# Patient Record
Sex: MALE | Employment: UNEMPLOYED | URBAN - METROPOLITAN AREA
[De-identification: names, ages, dates, MRNs, and addresses within clinical notes are randomized per-mention and may not be internally consistent; named-entity substitution may affect disease eponyms.]

---

## 2018-11-28 ENCOUNTER — APPOINTMENT (OUTPATIENT)
Dept: GENERAL RADIOLOGY | Age: 43
DRG: 897 | End: 2018-11-28
Attending: EMERGENCY MEDICINE
Payer: COMMERCIAL

## 2018-11-28 ENCOUNTER — HOSPITAL ENCOUNTER (INPATIENT)
Age: 43
LOS: 2 days | Discharge: HOME | DRG: 897 | End: 2018-11-30
Attending: EMERGENCY MEDICINE | Admitting: INTERNAL MEDICINE
Payer: COMMERCIAL

## 2018-11-28 ENCOUNTER — APPOINTMENT (OUTPATIENT)
Dept: CT IMAGING | Age: 43
DRG: 897 | End: 2018-11-28
Attending: EMERGENCY MEDICINE
Payer: COMMERCIAL

## 2018-11-28 DIAGNOSIS — R29.810 FACIAL WEAKNESS: Primary | ICD-10-CM

## 2018-11-28 DIAGNOSIS — F10.929 ALCOHOLIC INTOXICATION WITH COMPLICATION (HCC): ICD-10-CM

## 2018-11-28 DIAGNOSIS — R56.9 SEIZURE (HCC): ICD-10-CM

## 2018-11-28 DIAGNOSIS — F10.121 ALCOHOL INTOXICATION DELIRIUM (HCC): ICD-10-CM

## 2018-11-28 LAB
ALBUMIN SERPL-MCNC: 3.7 G/DL (ref 3.5–5)
ALBUMIN/GLOB SERPL: 0.9 {RATIO} (ref 1.1–2.2)
ALP SERPL-CCNC: 64 U/L (ref 45–117)
ALT SERPL-CCNC: 20 U/L (ref 12–78)
AMPHET UR QL SCN: NEGATIVE
ANION GAP SERPL CALC-SCNC: 9 MMOL/L (ref 5–15)
APPEARANCE UR: CLEAR
AST SERPL-CCNC: 22 U/L (ref 15–37)
BACTERIA URNS QL MICRO: NEGATIVE /HPF
BARBITURATES UR QL SCN: NEGATIVE
BASOPHILS # BLD: 0.1 K/UL (ref 0–0.1)
BASOPHILS NFR BLD: 1 % (ref 0–1)
BENZODIAZ UR QL: POSITIVE
BILIRUB SERPL-MCNC: 0.2 MG/DL (ref 0.2–1)
BILIRUB UR QL: NEGATIVE
BUN SERPL-MCNC: 7 MG/DL (ref 6–20)
BUN/CREAT SERPL: 10 (ref 12–20)
CALCIUM SERPL-MCNC: 8.2 MG/DL (ref 8.5–10.1)
CANNABINOIDS UR QL SCN: POSITIVE
CHLORIDE SERPL-SCNC: 105 MMOL/L (ref 97–108)
CO2 SERPL-SCNC: 29 MMOL/L (ref 21–32)
COCAINE UR QL SCN: NEGATIVE
COLOR UR: NORMAL
COMMENT, HOLDF: NORMAL
CREAT SERPL-MCNC: 0.69 MG/DL (ref 0.7–1.3)
DIFFERENTIAL METHOD BLD: ABNORMAL
DRUG SCRN COMMENT,DRGCM: ABNORMAL
EOSINOPHIL # BLD: 0.3 K/UL (ref 0–0.4)
EOSINOPHIL NFR BLD: 4 % (ref 0–7)
EPITH CASTS URNS QL MICRO: NORMAL /LPF
ERYTHROCYTE [DISTWIDTH] IN BLOOD BY AUTOMATED COUNT: 13.2 % (ref 11.5–14.5)
ETHANOL SERPL-MCNC: 290 MG/DL
GLOBULIN SER CALC-MCNC: 3.9 G/DL (ref 2–4)
GLUCOSE SERPL-MCNC: 89 MG/DL (ref 65–100)
GLUCOSE UR STRIP.AUTO-MCNC: NEGATIVE MG/DL
HCT VFR BLD AUTO: 44.4 % (ref 36.6–50.3)
HGB BLD-MCNC: 14.9 G/DL (ref 12.1–17)
HGB UR QL STRIP: NEGATIVE
HYALINE CASTS URNS QL MICRO: NORMAL /LPF (ref 0–5)
IMM GRANULOCYTES # BLD: 0 K/UL (ref 0–0.04)
IMM GRANULOCYTES NFR BLD AUTO: 0 % (ref 0–0.5)
KETONES UR QL STRIP.AUTO: NEGATIVE MG/DL
LEUKOCYTE ESTERASE UR QL STRIP.AUTO: NEGATIVE
LYMPHOCYTES # BLD: 3.9 K/UL (ref 0.8–3.5)
LYMPHOCYTES NFR BLD: 57 % (ref 12–49)
MAGNESIUM SERPL-MCNC: 2.3 MG/DL (ref 1.6–2.4)
MCH RBC QN AUTO: 32.8 PG (ref 26–34)
MCHC RBC AUTO-ENTMCNC: 33.6 G/DL (ref 30–36.5)
MCV RBC AUTO: 97.8 FL (ref 80–99)
METHADONE UR QL: NEGATIVE
MONOCYTES # BLD: 0.7 K/UL (ref 0–1)
MONOCYTES NFR BLD: 11 % (ref 5–13)
NEUTS SEG # BLD: 1.8 K/UL (ref 1.8–8)
NEUTS SEG NFR BLD: 27 % (ref 32–75)
NITRITE UR QL STRIP.AUTO: NEGATIVE
NRBC # BLD: 0 K/UL (ref 0–0.01)
NRBC BLD-RTO: 0 PER 100 WBC
OPIATES UR QL: NEGATIVE
PCP UR QL: NEGATIVE
PH UR STRIP: 5.5 [PH] (ref 5–8)
PLATELET # BLD AUTO: 276 K/UL (ref 150–400)
PMV BLD AUTO: 8.6 FL (ref 8.9–12.9)
POTASSIUM SERPL-SCNC: 3.3 MMOL/L (ref 3.5–5.1)
PROT SERPL-MCNC: 7.6 G/DL (ref 6.4–8.2)
PROT UR STRIP-MCNC: NEGATIVE MG/DL
RBC # BLD AUTO: 4.54 M/UL (ref 4.1–5.7)
RBC #/AREA URNS HPF: NORMAL /HPF (ref 0–5)
SAMPLES BEING HELD,HOLD: NORMAL
SODIUM SERPL-SCNC: 143 MMOL/L (ref 136–145)
SP GR UR REFRACTOMETRY: 1.01 (ref 1–1.03)
UR CULT HOLD, URHOLD: NORMAL
UROBILINOGEN UR QL STRIP.AUTO: 0.2 EU/DL (ref 0.2–1)
WBC # BLD AUTO: 6.7 K/UL (ref 4.1–11.1)
WBC URNS QL MICRO: NORMAL /HPF (ref 0–4)

## 2018-11-28 PROCEDURE — 93005 ELECTROCARDIOGRAM TRACING: CPT

## 2018-11-28 PROCEDURE — 71045 X-RAY EXAM CHEST 1 VIEW: CPT

## 2018-11-28 PROCEDURE — 74011250636 HC RX REV CODE- 250/636: Performed by: INTERNAL MEDICINE

## 2018-11-28 PROCEDURE — 83735 ASSAY OF MAGNESIUM: CPT

## 2018-11-28 PROCEDURE — 80053 COMPREHEN METABOLIC PANEL: CPT

## 2018-11-28 PROCEDURE — 81001 URINALYSIS AUTO W/SCOPE: CPT

## 2018-11-28 PROCEDURE — 36415 COLL VENOUS BLD VENIPUNCTURE: CPT

## 2018-11-28 PROCEDURE — 85025 COMPLETE CBC W/AUTO DIFF WBC: CPT

## 2018-11-28 PROCEDURE — 74011250636 HC RX REV CODE- 250/636: Performed by: EMERGENCY MEDICINE

## 2018-11-28 PROCEDURE — 70450 CT HEAD/BRAIN W/O DYE: CPT

## 2018-11-28 PROCEDURE — 65610000006 HC RM INTENSIVE CARE

## 2018-11-28 PROCEDURE — 80307 DRUG TEST PRSMV CHEM ANLYZR: CPT

## 2018-11-28 PROCEDURE — 96360 HYDRATION IV INFUSION INIT: CPT

## 2018-11-28 PROCEDURE — 99285 EMERGENCY DEPT VISIT HI MDM: CPT

## 2018-11-28 RX ORDER — LORAZEPAM 2 MG/ML
2 INJECTION INTRAMUSCULAR
Status: DISPENSED | OUTPATIENT
Start: 2018-11-28 | End: 2018-11-29

## 2018-11-28 RX ORDER — LORAZEPAM 2 MG/ML
2 INJECTION INTRAMUSCULAR
Status: DISCONTINUED | OUTPATIENT
Start: 2018-11-28 | End: 2018-11-30 | Stop reason: HOSPADM

## 2018-11-28 RX ORDER — SODIUM CHLORIDE, SODIUM LACTATE, POTASSIUM CHLORIDE, CALCIUM CHLORIDE 600; 310; 30; 20 MG/100ML; MG/100ML; MG/100ML; MG/100ML
150 INJECTION, SOLUTION INTRAVENOUS CONTINUOUS
Status: DISCONTINUED | OUTPATIENT
Start: 2018-11-28 | End: 2018-11-30 | Stop reason: HOSPADM

## 2018-11-28 RX ORDER — SODIUM CHLORIDE 0.9 % (FLUSH) 0.9 %
5-10 SYRINGE (ML) INJECTION AS NEEDED
Status: DISCONTINUED | OUTPATIENT
Start: 2018-11-28 | End: 2018-11-30 | Stop reason: HOSPADM

## 2018-11-28 RX ORDER — LORAZEPAM 2 MG/ML
4 INJECTION INTRAMUSCULAR
Status: DISCONTINUED | OUTPATIENT
Start: 2018-11-28 | End: 2018-11-30 | Stop reason: HOSPADM

## 2018-11-28 RX ORDER — ONDANSETRON 2 MG/ML
4 INJECTION INTRAMUSCULAR; INTRAVENOUS
Status: DISCONTINUED | OUTPATIENT
Start: 2018-11-28 | End: 2018-11-30 | Stop reason: HOSPADM

## 2018-11-28 RX ORDER — SODIUM CHLORIDE 0.9 % (FLUSH) 0.9 %
5-10 SYRINGE (ML) INJECTION EVERY 8 HOURS
Status: DISCONTINUED | OUTPATIENT
Start: 2018-11-28 | End: 2018-11-30 | Stop reason: HOSPADM

## 2018-11-28 RX ADMIN — SODIUM CHLORIDE 1000 ML: 900 INJECTION, SOLUTION INTRAVENOUS at 20:40

## 2018-11-29 ENCOUNTER — APPOINTMENT (OUTPATIENT)
Dept: MRI IMAGING | Age: 43
DRG: 897 | End: 2018-11-29
Attending: INTERNAL MEDICINE
Payer: COMMERCIAL

## 2018-11-29 LAB
ALBUMIN SERPL-MCNC: 3.2 G/DL (ref 3.5–5)
ALBUMIN/GLOB SERPL: 1 {RATIO} (ref 1.1–2.2)
ALP SERPL-CCNC: 53 U/L (ref 45–117)
ALT SERPL-CCNC: 18 U/L (ref 12–78)
AMYLASE SERPL-CCNC: 42 U/L (ref 25–115)
ANION GAP SERPL CALC-SCNC: 9 MMOL/L (ref 5–15)
AST SERPL-CCNC: 21 U/L (ref 15–37)
ATRIAL RATE: 85 BPM
BASOPHILS # BLD: 0.1 K/UL (ref 0–0.1)
BASOPHILS NFR BLD: 1 % (ref 0–1)
BILIRUB SERPL-MCNC: 0.3 MG/DL (ref 0.2–1)
BUN SERPL-MCNC: 6 MG/DL (ref 6–20)
BUN/CREAT SERPL: 10 (ref 12–20)
CALCIUM SERPL-MCNC: 7.5 MG/DL (ref 8.5–10.1)
CALCULATED P AXIS, ECG09: 73 DEGREES
CALCULATED R AXIS, ECG10: -5 DEGREES
CALCULATED T AXIS, ECG11: 60 DEGREES
CHLORIDE SERPL-SCNC: 108 MMOL/L (ref 97–108)
CHOLEST SERPL-MCNC: 186 MG/DL
CO2 SERPL-SCNC: 25 MMOL/L (ref 21–32)
CREAT SERPL-MCNC: 0.6 MG/DL (ref 0.7–1.3)
DIAGNOSIS, 93000: NORMAL
DIFFERENTIAL METHOD BLD: ABNORMAL
EOSINOPHIL # BLD: 0.3 K/UL (ref 0–0.4)
EOSINOPHIL NFR BLD: 5 % (ref 0–7)
ERYTHROCYTE [DISTWIDTH] IN BLOOD BY AUTOMATED COUNT: 13.2 % (ref 11.5–14.5)
GLOBULIN SER CALC-MCNC: 3.1 G/DL (ref 2–4)
GLUCOSE SERPL-MCNC: 72 MG/DL (ref 65–100)
HCT VFR BLD AUTO: 40.1 % (ref 36.6–50.3)
HDLC SERPL-MCNC: 80 MG/DL
HDLC SERPL: 2.3 {RATIO} (ref 0–5)
HGB BLD-MCNC: 13.3 G/DL (ref 12.1–17)
IMM GRANULOCYTES # BLD: 0 K/UL (ref 0–0.04)
IMM GRANULOCYTES NFR BLD AUTO: 0 % (ref 0–0.5)
LDLC SERPL CALC-MCNC: 89.4 MG/DL (ref 0–100)
LIPASE SERPL-CCNC: 100 U/L (ref 73–393)
LIPID PROFILE,FLP: NORMAL
LYMPHOCYTES # BLD: 2.4 K/UL (ref 0.8–3.5)
LYMPHOCYTES NFR BLD: 43 % (ref 12–49)
MCH RBC QN AUTO: 32.6 PG (ref 26–34)
MCHC RBC AUTO-ENTMCNC: 33.2 G/DL (ref 30–36.5)
MCV RBC AUTO: 98.3 FL (ref 80–99)
MONOCYTES # BLD: 0.7 K/UL (ref 0–1)
MONOCYTES NFR BLD: 13 % (ref 5–13)
NEUTS SEG # BLD: 2 K/UL (ref 1.8–8)
NEUTS SEG NFR BLD: 38 % (ref 32–75)
NRBC # BLD: 0 K/UL (ref 0–0.01)
NRBC BLD-RTO: 0 PER 100 WBC
P-R INTERVAL, ECG05: 162 MS
PLATELET # BLD AUTO: 239 K/UL (ref 150–400)
PMV BLD AUTO: 8.7 FL (ref 8.9–12.9)
POTASSIUM SERPL-SCNC: 3.2 MMOL/L (ref 3.5–5.1)
PROT SERPL-MCNC: 6.3 G/DL (ref 6.4–8.2)
Q-T INTERVAL, ECG07: 376 MS
QRS DURATION, ECG06: 84 MS
QTC CALCULATION (BEZET), ECG08: 447 MS
RBC # BLD AUTO: 4.08 M/UL (ref 4.1–5.7)
SODIUM SERPL-SCNC: 142 MMOL/L (ref 136–145)
TRIGL SERPL-MCNC: 83 MG/DL (ref ?–150)
VENTRICULAR RATE, ECG03: 85 BPM
VLDLC SERPL CALC-MCNC: 16.6 MG/DL
WBC # BLD AUTO: 5.4 K/UL (ref 4.1–11.1)

## 2018-11-29 PROCEDURE — 95816 EEG AWAKE AND DROWSY: CPT | Performed by: INTERNAL MEDICINE

## 2018-11-29 PROCEDURE — 74011000250 HC RX REV CODE- 250: Performed by: INTERNAL MEDICINE

## 2018-11-29 PROCEDURE — 74011250636 HC RX REV CODE- 250/636: Performed by: INTERNAL MEDICINE

## 2018-11-29 PROCEDURE — 80061 LIPID PANEL: CPT

## 2018-11-29 PROCEDURE — 74011250636 HC RX REV CODE- 250/636: Performed by: HOSPITALIST

## 2018-11-29 PROCEDURE — 65660000001 HC RM ICU INTERMED STEPDOWN

## 2018-11-29 PROCEDURE — 80053 COMPREHEN METABOLIC PANEL: CPT

## 2018-11-29 PROCEDURE — 82150 ASSAY OF AMYLASE: CPT

## 2018-11-29 PROCEDURE — 74011250637 HC RX REV CODE- 250/637: Performed by: INTERNAL MEDICINE

## 2018-11-29 PROCEDURE — 36415 COLL VENOUS BLD VENIPUNCTURE: CPT

## 2018-11-29 PROCEDURE — 74011250637 HC RX REV CODE- 250/637: Performed by: HOSPITALIST

## 2018-11-29 PROCEDURE — 83690 ASSAY OF LIPASE: CPT

## 2018-11-29 PROCEDURE — A9575 INJ GADOTERATE MEGLUMI 0.1ML: HCPCS | Performed by: HOSPITALIST

## 2018-11-29 PROCEDURE — 70553 MRI BRAIN STEM W/O & W/DYE: CPT

## 2018-11-29 PROCEDURE — 85025 COMPLETE CBC W/AUTO DIFF WBC: CPT

## 2018-11-29 RX ORDER — SODIUM CHLORIDE 0.9 % (FLUSH) 0.9 %
10 SYRINGE (ML) INJECTION
Status: COMPLETED | OUTPATIENT
Start: 2018-11-29 | End: 2018-11-29

## 2018-11-29 RX ORDER — CHLORDIAZEPOXIDE HYDROCHLORIDE 25 MG/1
25 CAPSULE, GELATIN COATED ORAL DAILY
Status: DISCONTINUED | OUTPATIENT
Start: 2018-12-02 | End: 2018-11-30 | Stop reason: HOSPADM

## 2018-11-29 RX ORDER — PAROXETINE HYDROCHLORIDE 20 MG/1
20 TABLET, FILM COATED ORAL DAILY
COMMUNITY

## 2018-11-29 RX ORDER — IBUPROFEN 200 MG
1 TABLET ORAL DAILY
Status: DISCONTINUED | OUTPATIENT
Start: 2018-11-29 | End: 2018-11-30 | Stop reason: HOSPADM

## 2018-11-29 RX ORDER — GADOTERATE MEGLUMINE 376.9 MG/ML
10 INJECTION INTRAVENOUS
Status: COMPLETED | OUTPATIENT
Start: 2018-11-29 | End: 2018-11-29

## 2018-11-29 RX ORDER — CHLORPROMAZINE HYDROCHLORIDE 25 MG/1
25 TABLET, FILM COATED ORAL
Status: DISCONTINUED | OUTPATIENT
Start: 2018-11-29 | End: 2018-11-30 | Stop reason: HOSPADM

## 2018-11-29 RX ORDER — SODIUM CHLORIDE 0.9 % (FLUSH) 0.9 %
SYRINGE (ML) INJECTION
Status: COMPLETED
Start: 2018-11-29 | End: 2018-11-29

## 2018-11-29 RX ORDER — POTASSIUM CHLORIDE 14.9 MG/ML
10 INJECTION INTRAVENOUS
Status: COMPLETED | OUTPATIENT
Start: 2018-11-29 | End: 2018-11-29

## 2018-11-29 RX ORDER — PAROXETINE HYDROCHLORIDE 20 MG/1
20 TABLET, FILM COATED ORAL DAILY
Status: DISCONTINUED | OUTPATIENT
Start: 2018-11-29 | End: 2018-11-30 | Stop reason: HOSPADM

## 2018-11-29 RX ORDER — LORAZEPAM 2 MG/ML
1 INJECTION INTRAMUSCULAR ONCE
Status: COMPLETED | OUTPATIENT
Start: 2018-11-29 | End: 2018-11-29

## 2018-11-29 RX ORDER — CHLORDIAZEPOXIDE HYDROCHLORIDE 25 MG/1
25 CAPSULE, GELATIN COATED ORAL SEE ADMIN INSTRUCTIONS
Status: DISCONTINUED | OUTPATIENT
Start: 2018-11-29 | End: 2018-11-29 | Stop reason: SDUPTHER

## 2018-11-29 RX ORDER — CHLORDIAZEPOXIDE HYDROCHLORIDE 25 MG/1
25 CAPSULE, GELATIN COATED ORAL 3 TIMES DAILY
Status: DISCONTINUED | OUTPATIENT
Start: 2018-11-29 | End: 2018-11-30 | Stop reason: HOSPADM

## 2018-11-29 RX ORDER — CHLORDIAZEPOXIDE HYDROCHLORIDE 25 MG/1
25 CAPSULE, GELATIN COATED ORAL 2 TIMES DAILY
Status: DISCONTINUED | OUTPATIENT
Start: 2018-12-01 | End: 2018-11-30 | Stop reason: HOSPADM

## 2018-11-29 RX ADMIN — Medication 10 ML: at 23:10

## 2018-11-29 RX ADMIN — GADOTERATE MEGLUMINE 10 ML: 376.9 INJECTION INTRAVENOUS at 16:33

## 2018-11-29 RX ADMIN — POTASSIUM CHLORIDE 10 MEQ: 14.9 INJECTION, SOLUTION INTRAVENOUS at 06:39

## 2018-11-29 RX ADMIN — LORAZEPAM 2 MG: 2 INJECTION INTRAMUSCULAR; INTRAVENOUS at 21:25

## 2018-11-29 RX ADMIN — Medication 10 ML: at 00:14

## 2018-11-29 RX ADMIN — FOLIC ACID: 5 INJECTION, SOLUTION INTRAMUSCULAR; INTRAVENOUS; SUBCUTANEOUS at 06:41

## 2018-11-29 RX ADMIN — Medication 10 ML: at 16:33

## 2018-11-29 RX ADMIN — POTASSIUM CHLORIDE 10 MEQ: 14.9 INJECTION, SOLUTION INTRAVENOUS at 08:35

## 2018-11-29 RX ADMIN — SODIUM CHLORIDE, SODIUM LACTATE, POTASSIUM CHLORIDE, AND CALCIUM CHLORIDE 150 ML/HR: 600; 310; 30; 20 INJECTION, SOLUTION INTRAVENOUS at 04:02

## 2018-11-29 RX ADMIN — Medication: at 15:46

## 2018-11-29 RX ADMIN — LORAZEPAM 1 MG: 2 INJECTION INTRAMUSCULAR; INTRAVENOUS at 17:49

## 2018-11-29 RX ADMIN — PAROXETINE HYDROCHLORIDE 20 MG: 20 TABLET, FILM COATED ORAL at 17:53

## 2018-11-29 RX ADMIN — SODIUM CHLORIDE, SODIUM LACTATE, POTASSIUM CHLORIDE, AND CALCIUM CHLORIDE 150 ML/HR: 600; 310; 30; 20 INJECTION, SOLUTION INTRAVENOUS at 17:42

## 2018-11-29 RX ADMIN — CHLORDIAZEPOXIDE HYDROCHLORIDE 25 MG: 25 CAPSULE ORAL at 23:09

## 2018-11-29 RX ADMIN — POTASSIUM CHLORIDE 10 MEQ: 14.9 INJECTION, SOLUTION INTRAVENOUS at 07:27

## 2018-11-29 RX ADMIN — LORAZEPAM 2 MG: 2 INJECTION INTRAMUSCULAR; INTRAVENOUS at 00:14

## 2018-11-29 RX ADMIN — LORAZEPAM 1 MG: 2 INJECTION INTRAMUSCULAR; INTRAVENOUS at 15:47

## 2018-11-29 NOTE — H&P
[address]  HISTORY AND PHYSICAL Priti Ceja 
MR#: [id number] : 1975 ACCOUNT #: [de-identified] ADMIT DATE: 2018 PRIMARY CARE PHYSICIAN:  None. SOURCE OF INFORMATION:  The patient and patient's relatives who were present at the bedside. CHIEF COMPLAINT:  Seizure. HISTORY OF PRESENT ILLNESS:  This is a 40-year-old man with a past medical history significant for alcohol abuse, suspected alcohol-related seizure, anxiety/depression who was in his usual state of health until the day of presentation at the emergency room when the patient developed seizure. According to the report, EMS was called to the patient's house because his spouse thought that the patient was having a stroke. When the EMS arrived at the scene, patient did not exhibit any signs of a stroke. He told the EMS crew that he has been having the hiccups for about 3 days. Patient had a seizure early in the day for which the patient refused to be brought to the emergency room for further evaluation. On the on the way to the emergency room, patient developed seizure activity which was described as a grand mal seizure. Patient was treated with Versed by the EMS crew. By the time the patient arrived at the emergency room, patient is alert and oriented. Patient drinks 1.5 bottles of vodka daily. His last alcoholic consumption was today. The patient has anxiety/depression. He has not been taking his Paxil and Lamictal for the anxiety/depression. Patient was once treated for alcohol abuse at a rehabilitation facility, but the patient continues to drink alcohol heavily. When the patient arrived at the emergency room, he was found to have an elevated serum alcohol level. No record of a prior admission to this hospital.  His urine toxicology was positive for benzodiazepine and marijuana.   Patient was referred to the hospitalist service for evaluation for admission. No history of fever, no rigors, no chills. PAST MEDICAL HISTORY:  Alcohol abuse, seizure disorder most likely related to alcohol, anxiety/depression. ALLERGIES:  NO KNOWN DRUG ALLERGIES. MEDICATIONS:  Paxil dosage unknown, Lamictal dosage unknown. FAMILY HISTORY:  This was reviewed. His father has prostate cancer. PAST SURGICAL HISTORY:  Not significant. SOCIAL HISTORY:  Patient drinks about 1.5 bottles of vodka daily, smokes about half a pack of cigarettes daily. REVIEW OF SYSTEMS:   
HEAD, EYES, EARS, NOSE, AND THROAT:  This is positive for seizure. No headache, no blurring of vision, no photophobia. RESPIRATORY SYSTEM:  No cough, no shortness of breath, no hemoptysis. CARDIOVASCULAR SYSTEM:  No chest pain, no orthopnea, no palpitations. GASTROINTESTINAL SYSTEM:  No nausea and vomiting, no diarrhea, no constipation. GENITOURINARY SYSTEM:  No dysuria, no urgency, and no frequency. All other systems are reviewed, and they are negative. PHYSICAL EXAMINATION:   
GENERAL APPEARANCE:  The patient appeared ill, in moderate distress. VITAL SIGNS:  On arrival at the emergency room, temperature 97.6, pulse at 88, respiratory rate at 14, blood pressure 120/79, oxygen saturation 95% on room air. HEAD:  Normocephalic, atraumatic. EYES:  Normal eye movement. No redness, no drainage. NOSE:  No deformity, no drainage. MOUTH AND THROAT:  No visible oral lesions. Dry oral mucosa. NECK:  Supple. No JVD, no thyromegaly. CHEST:  Clear breath sounds. No wheezing, no crackles. HEART:  Normal S1 and S2.  Regular. No clinically appreciable murmur. ABDOMEN:  Soft, nontender, normal bowel sounds. CENTRAL NERVOUS SYSTEM:  Alert, oriented x3. No gross focal neurological deficit. EXTREMITIES:  No edema. Pulses 2+ bilaterally. MUSCULOSKELETAL SYSTEM:  No obvious joint deformity or swelling. SKIN:  No active skin lesion seen in the exposed part of the body. PSYCHIATRIC:  Unable to assess the mood and affect. LYMPHATIC SYSTEM:  No cervical lymphadenopathy. DIAGNOSTIC DATA:  EKG shows sinus rhythm, no significant ST and T wave abnormalities. Chest x-ray:  No acute finding. CT scan of the head without contrast negative. Hematology:  WBC 6.7, hemoglobin 14.9, hematocrit 44.4, platelet at 726. Magnesium 2.3. Chemistry:  Sodium 143, potassium 3.3, chloride at 105, CO2 of 29, glucose 89, BUN 7, creatinine 0.69, calcium 8.2, total bilirubin 0.2, ALT 20, AST 22, alkaline phosphatase 64, total protein at 7.6, albumin level 3.7, globulin at 3.9. Serum alcohol level 290. Urinalysis: This is significant for negative nitrite, negative leukocyte esterase, negative blood. Urine drug screen: This is positive for benzodiazepine and cannabinoid. ASSESSMENT:     
1. Alcohol intoxication delirium. 2.  Seizure disorder. 3.  Hypokalemia. 4.  Marijuana use. 5.  Anxiety/depression. 6.  Tobacco abuse. 7.  Intractable hiccups. PLAN:   
1. Alcohol intoxication delirium. Will admit the patient for further evaluation and treatment. Will start the patient on banana bag. Patient will also be placed on CIWA protocol. Will monitor the patient closely. 2.  Seizure disorder. This is most likely alcohol related. Will obtain an MRI of the brain for further evaluation of the seizure. Will also obtain an EEG. Neurology consult will be requested to assist in the evaluation and treatment. 3.  Hypokalemia. Will replace the potassium and repeat a potassium level. Magnesium level is normal.   
4.  Marijuana use. Patient advised to quit. Will carry out supportive therapy. 5.  Anxiety/depression. Psychiatry consult will be requested to assist in further evaluation and treatment and also for alcohol abuse. 6.  Tobacco abuse. Patient advised to quit. Will place the patient on Nicoderm patch. 7.  Intractable hiccups. Will check an amylase and a lipase level. Will place the patient on Thorazine. OTHER ISSUES:  CODE STATUS:  PATIENT IS A FULL CODE. Will request SCD for DVT prophylaxis. Yoanna Mills MD 
 
  
RE/ 
D: 11/29/2018 05:08    
T: 11/29/2018 08:11 
JOB #: [id number]

## 2018-11-29 NOTE — PROGRESS NOTES
Bedside shift change report given to [address] Melissa (oncoming nurse) by Anurag Galeano (offgoing nurse). Report included the following information SBAR, Kardex, Intake/Output and Recent Results. 1600-Patient given 1mg IV ativan as pre-medication for MRI. 1710-Patient returned to room from MRI 
 
TRANSFER - OUT REPORT: 
 
Verbal report given to Charge Nurse on IMCU(name) on Anthony Casey  being transferred to [facility](unit) for routine progression of care Report consisted of patients Situation, Background, Assessment and  
Recommendations(SBAR). Information from the following report(s) SBAR, Kardex, [facility] and Recent Results was reviewed with the receiving nurse. Lines:  
Peripheral IV 11/29/18 Right;Mid Arm (Active) Site Assessment Clean, dry, & intact 11/29/2018  3:58 PM  
Phlebitis Assessment 0 11/29/2018  3:58 PM  
Infiltration Assessment 0 11/29/2018  3:58 PM  
Dressing Status Clean, dry, & intact 11/29/2018  3:58 PM  
Dressing Type Transparent 11/29/2018  3:58 PM  
Hub Color/Line Status Blue; Infusing 11/29/2018  3:58 PM  
  
 
Opportunity for questions and clarification was provided. Patient transported with: 
 Monitor Registered Nurse Tech

## 2018-11-29 NOTE — PROGRESS NOTES
Admission Medication Reconciliation: 
 
Information obtained from: This medication history was obtained from the patient; (s)he appears to be a reliable historian. An RX Query is available. Summary:  
 
Medications added: paroxetine Medications deleted: NA 
Dose changes: NA Inpatient orders were reviewed and no changes are needed. Chief Complaint for this Admission:  alcohol intoxication delerium Significant PMH/Disease States:  
Past Medical History:  
Diagnosis Date  Alcohol abuse  Anxiety Allergies:  Patient has no known allergies. Prior to Admission Medications:  
Prior to Admission Medications Prescriptions Last Dose Informant Patient Reported? Taking? PARoxetine (PAXIL) 20 mg tablet 11/28/2018  Yes Yes Sig: Take 20 mg by mouth daily. Facility-Administered Medications: None Thank you for allowing me to participate in the care of this patient. Please contact the pharmacy () or the medication reconciliation pharmacist () with any questions. Devonte Gonsalves, Pharm. D., BCPS, BCPPS

## 2018-11-29 NOTE — ED NOTES
Pt appears to be resting comfortably on stretcher with eyes closed. No s/s of acute distress noted. Will continue to monitor.

## 2018-11-29 NOTE — ED NOTES
Bedside and Verbal shift change report received from Jenifer Clifton RN (offgoing nurse). Report included the following information SBAR, ED Summary, MAR and Recent Results.

## 2018-11-29 NOTE — PROGRESS NOTES
Hospitalist Progress Note Jone Leal MD 
Answering service: [phone] OR [phone] from in house phone Date of Service:  2018 NAME:  Ronny Feng :  1975 MRN:  [id number] Admission Summary: This is a 25-year-old man with a past medical history significant for alcohol abuse, suspected alcohol-related seizure, anxiety/depression who was in his usual state of health until the day of presentation at the emergency room when the patient developed seizure. According to the report, EMS was called to the patient's house because his spouse thought that the patient was having a stroke. When the EMS arrived at the scene, patient did not exhibit any signs of a stroke. He told the EMS crew that he has been having the hiccups for about 3 days. Patient had a seizure early in the day for which the patient refused to be brought to the emergency room for further evaluation. On the on the way to the emergency room, patient developed seizure activity which was described as a grand mal seizure. Patient was treated with Versed by the EMS crew. By the time the patient arrived at the emergency room, patient is alert and oriented. Patient drinks 1.5 bottles of vodka daily. His last alcoholic consumption was today. The patient has anxiety/depression. He has not been taking his Paxil and Lamictal for the anxiety/depression. Patient was once treated for alcohol abuse at a rehabilitation facility, but the patient continues to drink alcohol heavily. When the patient arrived at the emergency room, he was found to have an elevated serum alcohol level. No record of a prior admission to this hospital.  His urine toxicology was positive for benzodiazepine and marijuana. Patient was referred to the hospitalist service for evaluation for admission. No history of fever, no rigors, no chills. Interval history / Subjective: Pt seen in room Reports he had a seizure yesterday Reports he is not on seizure medicaitons  As OP Assessment & Plan: 1. Alcohol intoxication delirium. Continue CIWA Will order Librium for Detox Monitor Closely on [facility] 2.  Seizure disorder. This is most likely alcohol related. Followup  EEG and MRI Request Neurology eval  
 
3. Hypokalemia. Replete PRN 
 
4. Marijuana use. Patient advised to quit. 5.  Anxiety/depression. Psychiatry consult will be requested to assist in further evaluation and treatment and also for alcohol abuse. 6.  Tobacco abuse. Patient advised to quit. Will place the patient on Nicoderm patch. 7.  Intractable hiccups. Lipase amylase Normal 
Possibly from GERD 
on Thorazine.   
  
 
 
Code status: Full DVT prophylaxis: SCD Care Plan discussed with: Patient/Family and Nurse Patient has given Verbal permission to discuss medical care with  
persons present in the room and and also with contact as listed on face sheet. Disposition: D Hospital Problems  Date Reviewed: 11/28/2018 Codes Class Noted POA * (Principal) [facility] intoxication delirium ([facility] ICD-10-CM: U20.012 ICD-9-CM: 291.0  11/28/2018 Yes Review of Systems:  
Pertinent items are noted in HPI. Vital Signs:  
 Last 24hrs VS reviewed since prior progress note. Most recent are: 
Visit Vitals /80 Pulse 87 Temp 98.4 °F (36.9 °C) Resp 17 Ht 5' 8\" (1.727 m) Wt 57.8 kg (127 lb 6.8 oz) SpO2 95% BMI 19.37 kg/m² Intake/Output Summary (Last 24 hours) at 11/29/2018 1112 Last data filed at 11/29/2018 0800 Gross per 24 hour Intake  Output 300 ml Net -300 ml Physical Examination:  
 
 
     
Constitutional:  No acute distress, cooperative, pleasant   
ENT:  Oral mucous moist, oropharynx benign. Neck supple, Resp:  CTA bilaterally. No wheezing/rhonchi/rales. No accessory muscle use CV:  Regular rhythm, normal rate, no murmurs, gallops, rubs GI:  Soft, non distended, non tender. normoactive bowel sounds, no hepatosplenomegaly Musculoskeletal:  No edema, warm, 2+ pulses throughout Neurologic:  Moves all extremities. AAOx3, CN II-XII reviewed Psych:  Good insight, Not anxious nor agitated. Data Review:  
 Review and/or order of clinical lab test 
Review and/or order of tests in the radiology section of [facility] Labs:  
 
Recent Labs  
  11/29/18 0408 11/28/18 2016 WBC 5.4 6.7 HGB 13.3 14.9 HCT 40.1 44.4  276 Recent Labs  
  11/29/18 0408 11/28/18 2016  143  
K 3.2* 3.3*  
 105 CO2 25 29 BUN 6 7 CREA 0.60* 0.69* GLU 72 89  
CA 7.5* 8.2* MG  --  2.3 Recent Labs  
  11/29/18 0408 11/28/18 2016 SGOT 21 22 ALT 18 20 AP 53 64 TBILI 0.3 0.2 TP 6.3* 7.6 ALB 3.2* 3.7 GLOB 3.1 3.9 AML 42  --   
LPSE 100  -- No results for input(s): INR, PTP, APTT in the last 72 hours. No lab exists for component: INREXT No results for input(s): FE, TIBC, PSAT, FERR in the last 72 hours. No results found for: FOL, RBCF No results for input(s): PH, PCO2, PO2 in the last 72 hours. No results for input(s): CPK, CKNDX, TROIQ in the last 72 hours. No lab exists for component: CPKMB Lab Results Component Value Date/Time Cholesterol, total 186 11/29/2018 04:08 AM  
 HDL Cholesterol 80 11/29/2018 04:08 AM  
 LDL, calculated 89.4 11/29/2018 04:08 AM  
 Triglyceride 83 11/29/2018 04:08 AM  
 CHOL/HDL Ratio 2.3 11/29/2018 04:08 AM  
 
No results found for: Bridgeton Peed Lab Results Component Value Date/Time  Color YELLOW/STRAW 11/28/2018 09:28 PM  
 Appearance CLEAR 11/28/2018 09:28 PM  
 Specific gravity 1.014 11/28/2018 09:28 PM  
 pH (UA) 5.5 11/28/2018 09:28 PM  
 Protein NEGATIVE  11/28/2018 09:28 PM  
 Glucose NEGATIVE  11/28/2018 09:28 PM  
 Ketone NEGATIVE  11/28/2018 09:28 PM  
 Bilirubin NEGATIVE  11/28/2018 09:28 PM  
 Urobilinogen 0.2 11/28/2018 09:28 PM  
 Nitrites NEGATIVE  11/28/2018 09:28 PM  
 Leukocyte Esterase NEGATIVE  11/28/2018 09:28 PM  
 Epithelial cells FEW 11/28/2018 09:28 PM  
 Bacteria NEGATIVE  11/28/2018 09:28 PM  
 WBC 0-4 11/28/2018 09:28 PM  
 RBC 0-5 11/28/2018 09:28 PM  
 
 
 
Medications Reviewed:  
 
Current Facility-Administered Medications Medication Dose Route Frequency  chlorproMAZINE (THORAZINE) tablet 25 mg  25 mg Oral Q6H PRN  
 nicotine (NICODERM CQ) 21 mg/24 hr patch 1 Patch  1 Patch TransDERmal DAILY  sodium chloride (NS) flush 5-10 mL  5-10 mL IntraVENous Q8H  
 sodium chloride (NS) flush 5-10 mL  5-10 mL IntraVENous PRN  
 lactated Ringers 5,388 mL with folic acid 1 mg, thiamine 100 mg, mvi, adult no. 4 with vit K 10 mL infusion   IntraVENous Q24H  
 lactated Ringers infusion  150 mL/hr IntraVENous CONTINUOUS  
 ondansetron (ZOFRAN) injection 4 mg  4 mg IntraVENous Q4H PRN  
 LORazepam (ATIVAN) injection 2 mg  2 mg IntraVENous Q1H PRN  
 LORazepam (ATIVAN) injection 4 mg  4 mg IntraVENous Q1H PRN Current Outpatient Medications Medication Sig  PARoxetine (PAXIL) 20 mg tablet Take 20 mg by mouth daily. ______________________________________________________________________ EXPECTED LENGTH OF STAY: - - - 
ACTUAL LENGTH OF STAY:          1 
 
            
Eugene Johnson MD

## 2018-11-29 NOTE — ED NOTES
The documentation for this period is being entered following the guidelines as defined in the [facility] policy by Louisa Valdez.

## 2018-11-29 NOTE — ED PROVIDER NOTES
37 y.o. male with past medical history significant for Anxiety and Alcohol abuse who presents from home via EMS with chief complaint of seizure. EMS reports they were called to pt's home for possible stroke. Upon arrival pt did not exhibit stroke symptoms but was c/o hiccups x3 days. EMS reports pt was not confused and was AOX4 upon their arrival. EMS reports history of EtOH abuse and state pt had \"drank 1.5 bottles of vodka\" PTA. EMS reports the amount of alcohol pt consumed today \"is normal for him\". EMS reports pt had grand mal seizure en route to ED and they administered 2.5 of versed. EMS reports history of seizures and non compliance with medication. EMS reports they were called to pt's house earlier today for seizure but state he refused transport. Pt's brother in law reports pt stopped taking Paxil and Lamictal \"1 month ago\" and began having multiple seizures. Pt's brother in law reports pt recently fell down the stairs while having a seizure and had to go to the hospital. Pt's brother in law also reports pt recently began taking Paxil again. Per brother in law, pt's wife reports she thought pt was having a stroke earlier today because the \"L side of his face appeared weak\". There are no other acute medical concerns at this time. Social hx: Current smoker; EtOH use (1.5 bottle of liquor/day); Drug use (Marijuana) Note written by Rajiv Stratton, as dictated by Enrique Snyder MD 8:15 PM 
 
 
 
The history is provided by the patient, the EMS personnel and a relative (brother in law). No  was used. Past Medical History:  
Diagnosis Date  Alcohol abuse  Anxiety History reviewed. No pertinent surgical history. History reviewed. No pertinent family history. Social History Socioeconomic History  Marital status: Not on file Spouse name: Not on file  Number of children: Not on file  Years of education: Not on file  Highest education level: Not on file Social Needs  Financial resource strain: Not on file  Food insecurity - worry: Not on file  Food insecurity - inability: Not on file  Transportation needs - medical: Not on file  Transportation needs - non-medical: Not on file Occupational History  Not on file Tobacco Use  Smoking status: Current Every Day Smoker Substance and Sexual Activity  Alcohol use: Yes Comment: 1.5 bottle of vodka daily  Drug use: Yes Types: Marijuana  Sexual activity: Not on file Other Topics Concern  Not on file Social History Narrative  Not on file ALLERGIES: Patient has no known allergies. Review of Systems Constitutional: Negative for appetite change, chills and fever. HENT: Negative for rhinorrhea, sore throat and trouble swallowing. Eyes: Negative for photophobia. Respiratory: Negative for cough and shortness of breath. Cardiovascular: Negative for chest pain and palpitations. Gastrointestinal: Negative for abdominal pain, nausea and vomiting. Genitourinary: Negative for dysuria, frequency and hematuria. Musculoskeletal: Negative for arthralgias. Neurological: Positive for seizures. Negative for dizziness, syncope and weakness. Psychiatric/Behavioral: Negative for behavioral problems. The patient is not nervous/anxious. All other systems reviewed and are negative. Vitals:  
 11/28/18 2009 BP: 120/79 Pulse: 88 Resp: 14 Temp: 97.6 °F (36.4 °C) SpO2: 95% Weight: 57.8 kg (127 lb 6.8 oz) Physical Exam  
Constitutional: He appears well-developed and well-nourished. He is sleeping. Pt tends to sleep but is easily arousable. Pt does not follow simple commands. Pt is actively having hiccups. HENT:  
Head: Normocephalic and atraumatic. Mouth/Throat: Oropharynx is clear and moist.  
Eyes: EOM are normal. Pupils are equal, round, and reactive to light. Neck: Normal range of motion. Neck supple. Cardiovascular: Normal rate, regular rhythm, normal heart sounds and intact distal pulses. Exam reveals no gallop and no friction rub. No murmur heard. Pulmonary/Chest: Effort normal. No respiratory distress. He has no wheezes. He has no rales. Abdominal: Soft. There is no tenderness. There is no rebound. Musculoskeletal: Normal range of motion. He exhibits no tenderness. Neurological: He is alert. No cranial nerve deficit. Motor; symmetric Skin: No erythema. Psychiatric: He has a normal mood and affect. His behavior is normal.  
Nursing note and vitals reviewed. Note written by Rajiv Monterroso, as dictated by Chanel Baltazar MD 8:15 PM 
 
MDM Procedures ED EKG interpretation: 
Rhythm: normal sinus rhythm; and regular . Rate (approx.): 85; Axis: normal; P wave: normal; QRS interval: normal ; ST/T wave: normal; in  Lead: V1 q and V2 q; Other findings: . This EKG was interpreted by Priti Stafford MD,ED Provider. 8:15 PM 
 
CONSULT NOTE: 
10:00 PM Chanel Baltazar MD communicated with Dr. Cleo Gaona, Consult for Hospitalist via [facility] Text. Discussed available diagnostic tests and clinical findings. Dr. Cleo Gaona will admit pt.

## 2018-11-29 NOTE — ED NOTES
Pt has no complaints at this time. Pt continues to be a&o x4 and is requesting something to drink. Pt aware that he is NPO and states understanding.

## 2018-11-29 NOTE — ED NOTES
Bedside and Verbal shift change report given to Yves Medrano RN (oncoming nurse) by Larry Palacios RN (offgoing nurse). Report included the following information SBAR, ED Summary, MAR and Recent Results.

## 2018-11-29 NOTE — ED TRIAGE NOTES
Pt arrives via EMS from home for possible stroke, pt did not exhibit and stroke symptoms upon EMS arrival. Pt c/o hiccups x 3 days. According to EMS, pt had a 2 minute grandmal seizure en route. 2.5 Versed given en route Pt has history of alcohol-induced seizures, does not take medications. Pt reports drinking 1.5 bottle of vodka today, which is what he usually drinks daily.   BG 99

## 2018-11-29 NOTE — ED NOTES
Pt resting on hospital bed with eyes closed. No s/s of acute distress. Will continue to monitor. Message sent to pharmacy regarding lactated ringers with additives

## 2018-11-29 NOTE — ED NOTES
Pt assisted onto hospital bed from stretcher. Pt has no complaints at this time. VSS. Pt able to hold saturations above 95% on room air. Call bell within reach. Will continue to monitor.

## 2018-11-30 VITALS
SYSTOLIC BLOOD PRESSURE: 110 MMHG | HEIGHT: 68 IN | BODY MASS INDEX: 20.92 KG/M2 | OXYGEN SATURATION: 96 % | HEART RATE: 84 BPM | RESPIRATION RATE: 13 BRPM | WEIGHT: 138 LBS | DIASTOLIC BLOOD PRESSURE: 82 MMHG | TEMPERATURE: 98.2 F

## 2018-11-30 PROCEDURE — 74011250636 HC RX REV CODE- 250/636: Performed by: INTERNAL MEDICINE

## 2018-11-30 PROCEDURE — 74011000250 HC RX REV CODE- 250: Performed by: INTERNAL MEDICINE

## 2018-11-30 PROCEDURE — 74011250637 HC RX REV CODE- 250/637: Performed by: INTERNAL MEDICINE

## 2018-11-30 PROCEDURE — 74011250637 HC RX REV CODE- 250/637: Performed by: HOSPITALIST

## 2018-11-30 RX ORDER — POTASSIUM CHLORIDE 20 MEQ/1
20 TABLET, EXTENDED RELEASE ORAL 2 TIMES DAILY
Qty: 14 TAB | Refills: 0 | Status: SHIPPED | OUTPATIENT
Start: 2018-11-30

## 2018-11-30 RX ORDER — LANOLIN ALCOHOL/MO/W.PET/CERES
100 CREAM (GRAM) TOPICAL DAILY
Qty: 30 TAB | Refills: 0 | Status: SHIPPED | OUTPATIENT
Start: 2018-11-30

## 2018-11-30 RX ORDER — FOLIC ACID 1 MG/1
1 TABLET ORAL DAILY
Qty: 30 TAB | Refills: 0 | Status: SHIPPED | OUTPATIENT
Start: 2018-11-30

## 2018-11-30 RX ORDER — CALCIUM CARBONATE 300MG(750)
400 TABLET,CHEWABLE ORAL 2 TIMES DAILY
Qty: 15 TAB | Refills: 0 | Status: SHIPPED | OUTPATIENT
Start: 2018-11-30

## 2018-11-30 RX ORDER — IBUPROFEN 200 MG
1 TABLET ORAL DAILY
Qty: 15 PATCH | Refills: 0 | Status: SHIPPED | OUTPATIENT
Start: 2018-12-01

## 2018-11-30 RX ORDER — CHLORDIAZEPOXIDE HYDROCHLORIDE 25 MG/1
25 CAPSULE, GELATIN COATED ORAL SEE ADMIN INSTRUCTIONS
Qty: 4 CAP | Refills: 0 | Status: SHIPPED | OUTPATIENT
Start: 2018-11-30

## 2018-11-30 RX ADMIN — Medication 10 ML: at 06:40

## 2018-11-30 RX ADMIN — PAROXETINE HYDROCHLORIDE 20 MG: 20 TABLET, FILM COATED ORAL at 09:07

## 2018-11-30 RX ADMIN — FOLIC ACID: 5 INJECTION, SOLUTION INTRAMUSCULAR; INTRAVENOUS; SUBCUTANEOUS at 02:20

## 2018-11-30 RX ADMIN — CHLORDIAZEPOXIDE HYDROCHLORIDE 25 MG: 25 CAPSULE ORAL at 09:07

## 2018-11-30 RX ADMIN — SODIUM CHLORIDE, SODIUM LACTATE, POTASSIUM CHLORIDE, AND CALCIUM CHLORIDE 150 ML/HR: 600; 310; 30; 20 INJECTION, SOLUTION INTRAVENOUS at 11:02

## 2018-11-30 NOTE — PROGRESS NOTES
0840.Recieved a call from patient's father Deborah Fernández stating if patient is discharged patient can hurt himself or others through drinking and patient needs to be admitted to some facility where he is safe. 0900 Assessed patient for suicidal ideation. patient stated \"i am terrified of hurting myself\". Patient is calm. Patient denies any suicidal ideation,patient states he does not have any thoughts of hurting himself or others and he has no plans of hurting himself or others. Will notify provider and will continue to monitor. 1841. Notified Dr Micha Fisher of patient's father call and suicidal assesment. MD ordered to notify Psych and consult . Will continue to monitor. 
1000. Notified Dr Corinna Wayne of patient's current situation noted above. MD said he will see the patient today.

## 2018-11-30 NOTE — PROGRESS NOTES
Pt's father called hospital and expressed worry about pt's stability and possible return to alcohol use and felt pt may be suicidal.  Staff talked with patient who denied being depressed or suicidal ideation. Stated family unhappy with him and he with they for their interference in his life. Pt has also talked to father to straighten things out. On mental status pt is alert and oriented, not showing signs of withdrawal save mild increased in intensity of affect. Cognitive function intact. No suicidal thoughts, plans, intent. Mood irritable not depressed or sad. Dx: Alcohol dependence Not a suicidal risk Not interested in outpatient substance abuse treatment or AA Prognosis not favorable re substance abuse. Discharge when medically stable

## 2018-11-30 NOTE — PROGRESS NOTES
Reason for Admission:  Alcohol intoxication, delirium RRAT Score:   4/ low Plan for utilizing home health: Will not need home health Likelihood of Readmission:  low Transition of Care Plan:  CM received consult to evaluate pt for safe discharge. CM talked to pt about a phone call that his nurse reported to have received from pt's father, Alexandro Burdick, at which time his father informed the nurse that pt had commented that he would hurt himself or someone else if discharged. Pt denied making that statement and said he does not want to hurt himself or anyone else. Pt said he had just spoken with his father over the phone and that none of that was discussed. Pt said that his father is trying to help arrange a 28 day program for him, outside of [place]. preferably in [place]. CM again asked pt about any thoughts or feelings he had in regard to self harm or harm to others. Pt again denied any. CM talked to pt about his attending [place] [address] meetings. He stated that he had attended some in the past. CM provided pt with a list of Substance Abuse/ Prevention references and services. He accepted the list and stated that he was familiar with some of them. CM talked to pt about his self pay status. He stated that he has [employer] with his job but had been laid off last week. However, the insurance should be active. He does not have the insurance information with him. He stated that his wife can bring it in later today when she comes to visit. CM informed pt to contact [facility] Admission office or when he gets a bill to call the phone number on the bill to give his insurance information. Pt does not have a PCP but is planning to f/u with Dr Evgeny Schroeder and have him as his PCP. Care Management Interventions PCP Verified by CM: No(Pt planning to obtain Evgeny Schroeder MD, for PCP) Palliative Care Criteria Met (RRAT>21 & CHF Dx)?: No 
Mode of Transport at Discharge: Other (see comment)(pt's wife will come to bring him bus fare to get home) Discharge Durable Medical Equipment: No 
Physical Therapy Consult: No 
Occupational Therapy Consult: No 
Current Support Network: Lives with Spouse Confirm Follow Up Transport: Self(uses public transportation) Plan discussed with Pt/Family/Caregiver: Yes Freedom of Choice Offered: Yes Discharge Location Discharge Placement: Home Marcos Kim RN ACM CRM

## 2018-11-30 NOTE — PROGRESS NOTES
Problem: Falls - Risk of 
Goal: *Absence of Falls Document Namrata Villanueva Fall Risk and appropriate interventions in the flowsheet. Outcome: Progressing Towards Goal 
Fall Risk Interventions: 
  
 
  
 
Medication Interventions: Teach patient to arise slowly Elimination Interventions: Call light in reach Problem: Alcohol Withdrawal 
Goal: *STG: Remains safe in hospital 
CIWA score 0 to1. No agitation or confusion. Psych following to assess patient's needs to help quitting alcohol and drug use.  Patient progressing towards the goal.

## 2018-11-30 NOTE — PROGRESS NOTES
1800 - TRANSFER - IN REPORT: 
 
Verbal report received from [place], [place] (name) on Emilee Stanley  being received from ED (unit) for routine progression of care Report consisted of patients Situation, Background, Assessment and  
Recommendations(SBAR). Information from the following report(s) SBAR, Kardex, ED Summary, Procedure Summary, Intake/Output, MAR, Recent Results and Med Rec Status was reviewed with the receiving nurse. Opportunity for questions and clarification was provided. Assessment completed upon patients arrival to unit and care assumed. SKIN ASSESSMENT: Primary Nurse Janet Quiles and Eduard Paris RN performed a dual skin assessment on this patient No impairment noted Fred score is 21

## 2018-11-30 NOTE — CONSULTS
[address]    Danielle Starks  MR#: [id number]  : 1975  ACCOUNT #: [de-identified]   DATE OF SERVICE: 2018    HISTORY OF PRESENT ILLNESS:  This is a 40-year-old right-handed male who was admitted on  after a seizure. According to chart notes, EMS had been called to help earlier in the day for seizure and patient had refused transport. They were called back to the house and the patient complained of hiccups for the last 3 days. But his wife reported seeing left facial droop, not present when EMS arrived. On the way to the hospital, he was reported to have a generalized tonic-clonic seizure in the ambulance and was given Versed and was back to baseline on arrival.  The patient is an alcoholic, is drinking half a bottle of vodka a day and tells me that he began having seizures this year while trying to quit drinking. He notes that a friend of his with epilepsy will \"just go out\" with his seizures, but the patient reports he does not \"go out. \"  He is conscious the whole time he is shaking all over. He notes sometimes his friends will \"freak out and call EMS\", but he does not feel it is necessary. When asked about loss of awareness or loss of consciousness in the ambulance with that spell, he does think he had loss of awareness or consciousness. The patient did drink alcohol yesterday though trying to cut back. His alcohol level was 290. His workup has included a head CT, MRI of the brain and EEG, all unremarkable. CBC was normal.  UA negative. LDL is 89. CMP with potassium of 3.3. Urine drug screen positive for benzos and THC. PAST MEDICAL HISTORY:  Anxiety and depression, alcohol abuse, medication noncompliance, history by patient report of alcohol withdrawal seizure. MEDICATIONS:  At home: Paxil, had been on Lamictal for his mood, but reports not being able to afford it as he has not had health insurance.   Here, he has been started on Thorazine, Paxil and Librium. ALLERGIES:  NONE. SOCIAL HISTORY:  He is , drinks 1-1/2 bottles of vodka a day. Previously smoked cigarettes, half a pack a day, but trying to quit so switched to vaping and is weaning off of this. He is smoking marijuana. FAMILY HISTORY:  Father with prostate cancer. No seizures in the family. PHYSICAL EXAMINATION:  VITAL SIGNS:  Blood pressure 135/81, pulse 94, respiratory rate 18, satting 94% on room air, temperature is 98.6, BMI of 21.2. GENERAL:  He is a well-nourished, well-developed male sitting in bed in no distress. HEART:  Regular rhythm. It is tachycardic. No murmurs. Carotids 2+. No bruits. EXTREMITIES:  Warm. No edema, 2+ radial pulses. NEUROLOGIC:  Mental status:  He is alert. He is oriented x4. Speech and language intact. Attention, memory, fund of knowledge appropriate. Cranial nerves:  No asymmetry. No ptosis. Extraocular eye movements are intact. No diplopia or nystagmus. His visual fields are full. His pupils are round and reactive. His tongue is midline. His palate elevated symmetrically. Strength, sensation, and hearing intact. Trapezius and sternocleidomastoid 5/5. Motor exam is 5/5 throughout, no pronator drift. No tremor. Sensory exam is intact to light touch and pinprick throughout. Reflexes 2+ and symmetric. Toes are downgoing. Coordination is intact finger-to-nose, heel-to-shin, rapid alternating movements. Gait is not assessed at this time. Studies and reports were reviewed above in the HPI. ASSESSMENT AND PLAN:  This is a 78-year-old right-handed gentleman admitted yesterday after his wife thought his left face seemed like it was drooping, but then had a seizure in the ambulance. The patient is an alcoholic and notes he is trying to quit drinking.   Did drink yesterday with alcohol level of 290 at presentation and reports onset of seizures this year while trying to quit drinking, but goes on to describe generalized shaking with retained consciousness. Exam is nonfocal and unremarkable. Some components of his history are suggestive of nonepileptic spells. Certainly alcohol withdrawal seizures are a possibility, but given his normal EEG and MRI, I would not start him on an AED at this time unless he were to have seizures unrelated to tapering off of alcohol and then he would need to be reassessed. Please call with any questions.       Arun Root MD       MR / Trice Rogel  D: 11/30/2018 00:26     T: 11/30/2018 01:01  JOB #: [id number]

## 2018-11-30 NOTE — DISCHARGE SUMMARY
Discharge Summary       PATIENT ID: Jonn Chairez  MRN: [id number]   YOB: 1975    DATE OF ADMISSION: 11/28/2018  8:02 PM    DATE OF DISCHARGE: 11/30/18  PRIMARY CARE PROVIDER: None       DISCHARGING PROVIDER: Dewey Foster MD    To contact this individual call [phone] and ask the  to page. If unavailable ask to be transferred the Adult Hospitalist Department. CONSULTATIONS: IP CONSULT TO HOSPITALIST  IP CONSULT TO NEUROLOGY  IP CONSULT TO PSYCHIATRY      PROCEDURES/SURGERIES: * No surgery found *    IMAGING  Mri Brain W Wo Cont    Result Date: 11/29/2018  IMPRESSION: Normal pre- and postcontrast brain MRI. Unchanged ethmoid air cell inflammation. Ct Head Wo Cont    Result Date: 11/28/2018  IMPRESSION: No acute abnormality identified     Xr Chest Port    Result Date: 11/28/2018  IMPRESSION: No acute findings           ADMITTING DIAGNOSES    HISTORY OF PRESENT ILLNESS per admitting: This is a 53-year-old man with a past medical history significant for alcohol abuse, suspected alcohol-related seizure, anxiety/depression who was in his usual state of health until the day of presentation at the emergency room when the patient developed seizure. According to the report, EMS was called to the patient's house because his spouse thought that the patient was having a stroke. When the EMS arrived at the scene, patient did not exhibit any signs of a stroke. He told the EMS crew that he has been having the hiccups for about 3 days. Patient had a seizure early in the day for which the patient refused to be brought to the emergency room for further evaluation. On the on the way to the emergency room, patient developed seizure activity which was described as a grand mal seizure. Patient was treated with Versed by the EMS crew. By the time the patient arrived at the emergency room, patient is alert and oriented.   Patient drinks 1.5 bottles of vodka daily. His last alcoholic consumption was today. The patient has anxiety/depression. He has not been taking his Paxil and Lamictal for the anxiety/depression. Patient was once treated for alcohol abuse at a rehabilitation facility, but the patient continues to drink alcohol heavily. When the patient arrived at the emergency room, he was found to have an elevated serum alcohol level. No record of a prior admission to this hospital.  His urine toxicology was positive for benzodiazepine and marijuana. Patient was referred to the hospitalist service for evaluation for admission. No history of fever, no rigors, no chills. HOSPITAL COURSE:   1.  Alcohol intoxication delirium. Was admitted to [facility] and on detox protocol  Librium for Detox  Advised on etoh cessation   Advised to take vitamin, thiamine and folate     2.  Seizure disorder.    This is most likely alcohol related.     MRI wnl   Neurology eval - no Antiepileptic drugs recommended  Advised not to drive car  Or operate skilled machinery     3.  Hypokalemia. Replete PRN     4.  Marijuana use.    Patient advised to quit.     5.  Anxiety/depression. Psychiatry consult done - recommends continue paxil and pt wants to follow up with op resources for alcohol cessation      6.  Tobacco abuse. Patient advised to quit. Sunil Jacobs place the patient on Nicoderm patch.       7.  Intractable hiccups.    Lipase amylase Normal  Possibly from GERD  Resolved         DISCHARGE DIAGNOSES / PLAN:      Patient Active Problem List   Diagnosis Code    Alcohol intoxication delirium ([facility] [address] F10.121         FOLLOW UP APPOINTMENTS:    Follow-up Information     Follow up With Specialties Details Why Contact Info    None    None ([phone]) Patient stated that they have no PCP             ADDITIONAL CARE RECOMMENDATIONS:   · It is important that you take the medication exactly as they are prescribed.    · Keep your medication in the bottles provided by the pharmacist and keep a list of the medication names, dosages, and times to be taken in your wallet. · Do not take other medications without consulting your doctor. · No drinking alcohol or driving car or operating machinery if you are on narcotic pain medications. Donot take sedating mediations if you are sleepy or confused. · Fall Precautions  · Keep Well Hydrated  · Report to your medical provider if you feel you have  developed allergies to medications  · Follow up with your PCP or Consultant for medication adjustments and refills  · Monitor for signs of fevers,chills,bleeding,chest pain and seek medical attention if you do so. DIET: Regular Diet    ACTIVITY: Activity as tolerated, no Driving        DISCHARGE MEDICATIONS:  Current Discharge Medication List      START taking these medications    Details   nicotine (NICODERM CQ) 21 mg/24 hr 1 Patch by TransDERmal route daily. Qty: 15 Patch, Refills: 0      chlordiazePOXIDE (LIBRIUM) 25 mg capsule Take 1 Cap by mouth See Admin Instructions. 25 mg po bid  Then 25 mg po daily for 2 days and stop  Qty: 4 Cap, Refills: 0    Associated Diagnoses: Alcohol intoxication delirium (HCC)      magnesium oxide 400 mg magnesium tab Take 400 mg by mouth two (2) times a day. Qty: 15 Tab, Refills: 0      potassium chloride (K-DUR, KLOR-CON) 20 mEq tablet Take 1 Tab by mouth two (2) times a day. Qty: 14 Tab, Refills: 0         CONTINUE these medications which have NOT CHANGED    Details   PARoxetine (PAXIL) 20 mg tablet Take 20 mg by mouth daily. All Micro Results     Procedure Component Value Units Date/Time    URINE CULTURE HOLD SAMPLE [[id number]] Collected:  11/28/18 2128    Order Status:  Completed Specimen:  Urine from Serum Updated:  11/28/18 2134     Urine culture hold       URINE ON HOLD IN MICROBIOLOGY DEPT FOR 3 DAYS. IF UNPRESERVED URINE IS SUBMITTED, IT CANNOT BE USED FOR ADDITIONAL TESTING AFTER 24 HRS, RECOLLECTION WILL BE REQUIRED. No results found for this or any previous visit (from the past 24 hour(s)). NOTIFY YOUR PHYSICIAN FOR ANY OF THE FOLLOWING:   Fever over 101 degrees for 24 hours. Chest pain, shortness of breath, fever, chills, nausea, vomiting, diarrhea, change in mentation, falling, weakness, bleeding. Severe pain or pain not relieved by medications. Or, any other signs or symptoms that you may have questions about. DISPOSITION:    Home With:   OT  PT  HH  RN       Long term SNF/Inpatient Rehab   X Independent/assisted living    Hospice    Other:       PATIENT CONDITION AT DISCHARGE:     Functional status    Poor     Deconditioned    X Independent      Cognition   X  Lucid     Forgetful     Dementia      Catheters/lines (plus indication)    Costa     PICC     PEG    X None      Code status    X Full code     DNR      PHYSICAL EXAMINATION AT DISCHARGE:  Gen:  No distress, alert  HEENT:  Normal cephalic atraumatic, extra-occular movements are intact. Neck:  Supple, No JVD  Lungs:  Clear bilaterally, no wheeze, no rales, normal effort  Heart:  Regular Rate and Rhythm, normal S1 and S2, no edema  Abdomen:  Soft, non tender, normal bowel sounds, no guarding. Extremities:  Well perfused, no cyanosis or edema  Neurological:  Awake and alert, CN's are intact, normal strength throughout extremities  Skin:  No rashes or moles  Mental Status:  Normal thought process, does not appear anxious      CHRONIC MEDICAL DIAGNOSES:  Problem List as of 11/30/2018 Date Reviewed: 11/28/2018          Codes Class Noted - Resolved    * (Principal) [facility] intoxication delirium ([facility] ICD-10-CM: N36.884  ICD-9-CM: 291.0  11/28/2018 - Present                Discussed with patient and family. Explained the importance of following up, Compliance with medications and recommendations on discharge,Side effect profile of medications were explained. Safety precautions at home and while taking pain medications also explained.  All questions answered to the satisfaction of the patient/family. Discussed with consultant(s) who are agreeable to the discharge. Verbal and written instructions on discharge given. Explained about Discharge medications and side effect profile.         Thank you None for taking care of your patient, Please call with any questions.       Greater than 36  minutes were spent with the patient on counseling and coordination of care    Signed:   Samuel Maciel MD  11/30/2018  12:37 PM

## 2018-11-30 NOTE — CONSULTS
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  Today he will continue dapsone 25 mg daily with occasional 50 mg daily for flares.  He had a normal CBC with differential and LFTs at his last check in late November.  He also will continue his gluten-free diet and follow up with us in approximately 4 months' time.              Neuro consult completed. Dictated note to follow. ?CANDY, ETOH w/d sz. EEG normal, MRI brain nl. Would not start AED at this time. If has seizures outside of trying to stop drinking alcohol, then will need to be reassessed. Please call if needed.

## 2018-11-30 NOTE — PROCEDURES
PROCEDURE: ROUTINE INPATIENT EEG  NAME:   Abdi Prescott  ACCOUNT NUMBER : [de-identified]  MRN:   [id number]  DATE OF SERVICE: 11/29/18    HISTORY/INDICATION: Pt is a 35yo male with reported GTC sz and alcohol intoxication with pt reporting h/o seizures in last year with generalized shaking and retained consciousness in a setting of attempting to wean alcohol consumption. EEG performed to evaluate for evidence of epilepsy. MEDICATIONS:   Current Facility-Administered Medications   Medication Dose Route Frequency Provider Last Rate Last Dose    chlorproMAZINE (THORAZINE) tablet 25 mg  25 mg Oral Q6H PRN David Chau MD        nicotine (NICODERM CQ) 21 mg/24 hr patch 1 Patch  1 Patch TransDERmal DAILY David Chau MD   1 Patch at 11/29/18 6243    PARoxetine (PAXIL) tablet 20 mg  20 mg Oral DAILY Margaret Panchla MD   20 mg at 11/29/18 1753    chlordiazePOXIDE (LIBRIUM) capsule 25 mg  25 mg Oral TID France Preston MD   25 mg at 11/29/18 2309    Followed by   Bonita Gonzalez ON 12/1/2018] chlordiazePOXIDE (LIBRIUM) capsule 25 mg  25 mg Oral BID Maria Isabel BENJAMIN MD        Followed by   Bonita Gonzalez ON 12/2/2018] chlordiazePOXIDE (LIBRIUM) capsule 25 mg  25 mg Oral DAILY Margaret Panchal MD        sodium chloride (NS) flush 5-10 mL  5-10 mL IntraVENous Q8H David Chau MD   10 mL at 11/29/18 2310    sodium chloride (NS) flush 5-10 mL  5-10 mL IntraVENous PRN David Chau MD        lactated Ringers 2,303 mL with folic acid 1 mg, thiamine 100 mg, mvi, adult no. 4 with vit K 10 mL infusion   IntraVENous Q24H David Chau MD        lactated Ringers infusion  150 mL/hr IntraVENous CONTINUOUS David Chau  mL/hr at 11/29/18 1742 150 mL/hr at 11/29/18 1742    ondansetron (ZOFRAN) injection 4 mg  4 mg IntraVENous Q4H PRN David Chau MD        LORazepam (ATIVAN) injection 2 mg  2 mg IntraVENous Q1H PRN David Chau MD   2 mg at 11/29/18 2125    LORazepam (ATIVAN) injection 4 mg  4 mg IntraVENous Q1H PRN David Chau MD           CONDITIONS OF RECORDING: This is a routine 21-channel EEG recording performed in accordance with the international 10-20 system with one channel devoted to limited EKG. This study was done during a state of wakefulness. Photic stimulation and hyperventilation were performed as activating procedures. DESCRIPTION:   Upon maximal arousal the posterior dominant rhythm has a frequency of 9Hz with an amplitude of 20uV. This activity is symmetric over the bilateral posterior derivations and attenuates with eye opening. Photic stimulation and hyperventilation do not significantly alter the tracing. No sleep is seen. There are no focal abnormalities, epileptiform discharges, or electrographic seizures seen. INTERPRETATION: Normal awake EEG    CLINICAL CORRELATION: A normal EEG does not definitively exclude a diagnosis of epilepsy if clinical suspicion is high consider sleep deprived EEG.      Sherley Willis MD

## 2018-11-30 NOTE — PROGRESS NOTES
Pt seen in ED today before admission to medical floor. Admitted for alcohol withdrawal syndrome. He has a hx of alcohol related seizures and stated he had one today leading to hospitalizaiton. Pt with significant alcohol dependence with treatment mostly for detox and no interested in anything else. On mental status he was alert, mildly tremulous, mild diaphoresis generally cooperative and oriented x 4. Some anxiety present, otherwise mood and affect ok. No psychotic sx. Cognitive function unimpaired. Dx: Alcohol dependence Developing withdrawal state Not expressing interest in treatment. Reconsult as needed.